# Patient Record
Sex: MALE | Race: WHITE | NOT HISPANIC OR LATINO | Employment: OTHER | URBAN - METROPOLITAN AREA
[De-identification: names, ages, dates, MRNs, and addresses within clinical notes are randomized per-mention and may not be internally consistent; named-entity substitution may affect disease eponyms.]

---

## 2023-10-10 ENCOUNTER — HOSPITAL ENCOUNTER (EMERGENCY)
Facility: HOSPITAL | Age: 73
Discharge: HOME | End: 2023-10-11
Attending: STUDENT IN AN ORGANIZED HEALTH CARE EDUCATION/TRAINING PROGRAM | Admitting: STUDENT IN AN ORGANIZED HEALTH CARE EDUCATION/TRAINING PROGRAM
Payer: COMMERCIAL

## 2023-10-10 DIAGNOSIS — R07.9 CHEST PAIN, UNSPECIFIED TYPE: ICD-10-CM

## 2023-10-10 DIAGNOSIS — J18.9 PNEUMONIA OF RIGHT UPPER LOBE DUE TO INFECTIOUS ORGANISM: ICD-10-CM

## 2023-10-10 DIAGNOSIS — K22.0 ACHALASIA OF ESOPHAGUS: ICD-10-CM

## 2023-10-10 PROCEDURE — 99285 EMERGENCY DEPT VISIT HI MDM: CPT | Mod: 25

## 2023-10-10 PROCEDURE — 93005 ELECTROCARDIOGRAM TRACING: CPT | Performed by: STUDENT IN AN ORGANIZED HEALTH CARE EDUCATION/TRAINING PROGRAM

## 2023-10-10 PROCEDURE — 96375 TX/PRO/DX INJ NEW DRUG ADDON: CPT

## 2023-10-10 PROCEDURE — 96365 THER/PROPH/DIAG IV INF INIT: CPT | Mod: 59

## 2023-10-11 ENCOUNTER — APPOINTMENT (OUTPATIENT)
Dept: CT IMAGING | Facility: HOSPITAL | Age: 73
End: 2023-10-11
Attending: STUDENT IN AN ORGANIZED HEALTH CARE EDUCATION/TRAINING PROGRAM
Payer: COMMERCIAL

## 2023-10-11 VITALS
DIASTOLIC BLOOD PRESSURE: 54 MMHG | BODY MASS INDEX: 20.46 KG/M2 | HEART RATE: 77 BPM | HEIGHT: 68 IN | WEIGHT: 135 LBS | SYSTOLIC BLOOD PRESSURE: 99 MMHG | TEMPERATURE: 98.6 F | OXYGEN SATURATION: 93 % | RESPIRATION RATE: 22 BRPM

## 2023-10-11 LAB
ANION GAP SERPL CALCULATED.3IONS-SCNC: 12 MMOL/L (ref 7–15)
BASO+EOS+MONOS # BLD AUTO: ABNORMAL 10*3/UL
BASO+EOS+MONOS NFR BLD AUTO: ABNORMAL %
BASOPHILS # BLD AUTO: 0.1 10E3/UL (ref 0–0.2)
BASOPHILS NFR BLD AUTO: 1 %
BUN SERPL-MCNC: 12.2 MG/DL (ref 8–23)
CALCIUM SERPL-MCNC: 10.1 MG/DL (ref 8.8–10.2)
CHLORIDE SERPL-SCNC: 98 MMOL/L (ref 98–107)
CREAT SERPL-MCNC: 0.87 MG/DL (ref 0.67–1.17)
DEPRECATED HCO3 PLAS-SCNC: 28 MMOL/L (ref 22–29)
EGFRCR SERPLBLD CKD-EPI 2021: >90 ML/MIN/1.73M2
EOSINOPHIL # BLD AUTO: 0.2 10E3/UL (ref 0–0.7)
EOSINOPHIL NFR BLD AUTO: 2 %
ERYTHROCYTE [DISTWIDTH] IN BLOOD BY AUTOMATED COUNT: 14 % (ref 10–15)
GLUCOSE SERPL-MCNC: 115 MG/DL (ref 70–99)
HCT VFR BLD AUTO: 45.3 % (ref 40–53)
HGB BLD-MCNC: 15.1 G/DL (ref 13.3–17.7)
HOLD SPECIMEN: NORMAL
IMM GRANULOCYTES # BLD: 0.1 10E3/UL
IMM GRANULOCYTES NFR BLD: 0 %
LYMPHOCYTES # BLD AUTO: 1.5 10E3/UL (ref 0.8–5.3)
LYMPHOCYTES NFR BLD AUTO: 11 %
MCH RBC QN AUTO: 32.4 PG (ref 26.5–33)
MCHC RBC AUTO-ENTMCNC: 33.3 G/DL (ref 31.5–36.5)
MCV RBC AUTO: 97 FL (ref 78–100)
MONOCYTES # BLD AUTO: 1.4 10E3/UL (ref 0–1.3)
MONOCYTES NFR BLD AUTO: 10 %
NEUTROPHILS # BLD AUTO: 10.8 10E3/UL (ref 1.6–8.3)
NEUTROPHILS NFR BLD AUTO: 76 %
NRBC # BLD AUTO: 0 10E3/UL
NRBC BLD AUTO-RTO: 0 /100
PLATELET # BLD AUTO: 408 10E3/UL (ref 150–450)
POTASSIUM SERPL-SCNC: 3.8 MMOL/L (ref 3.4–5.3)
RBC # BLD AUTO: 4.66 10E6/UL (ref 4.4–5.9)
SODIUM SERPL-SCNC: 138 MMOL/L (ref 135–145)
TROPONIN T SERPL HS-MCNC: 6 NG/L
TROPONIN T SERPL HS-MCNC: 6 NG/L
WBC # BLD AUTO: 13.9 10E3/UL (ref 4–11)

## 2023-10-11 PROCEDURE — 84484 ASSAY OF TROPONIN QUANT: CPT | Performed by: STUDENT IN AN ORGANIZED HEALTH CARE EDUCATION/TRAINING PROGRAM

## 2023-10-11 PROCEDURE — 250N000011 HC RX IP 250 OP 636: Mod: JZ | Performed by: STUDENT IN AN ORGANIZED HEALTH CARE EDUCATION/TRAINING PROGRAM

## 2023-10-11 PROCEDURE — 250N000013 HC RX MED GY IP 250 OP 250 PS 637: Performed by: STUDENT IN AN ORGANIZED HEALTH CARE EDUCATION/TRAINING PROGRAM

## 2023-10-11 PROCEDURE — 250N000011 HC RX IP 250 OP 636: Performed by: STUDENT IN AN ORGANIZED HEALTH CARE EDUCATION/TRAINING PROGRAM

## 2023-10-11 PROCEDURE — 36415 COLL VENOUS BLD VENIPUNCTURE: CPT | Performed by: STUDENT IN AN ORGANIZED HEALTH CARE EDUCATION/TRAINING PROGRAM

## 2023-10-11 PROCEDURE — 80048 BASIC METABOLIC PNL TOTAL CA: CPT | Performed by: STUDENT IN AN ORGANIZED HEALTH CARE EDUCATION/TRAINING PROGRAM

## 2023-10-11 PROCEDURE — 74174 CTA ABD&PLVS W/CONTRAST: CPT

## 2023-10-11 PROCEDURE — 85048 AUTOMATED LEUKOCYTE COUNT: CPT | Performed by: STUDENT IN AN ORGANIZED HEALTH CARE EDUCATION/TRAINING PROGRAM

## 2023-10-11 PROCEDURE — 250N000013 HC RX MED GY IP 250 OP 250 PS 637

## 2023-10-11 RX ORDER — HYDROMORPHONE HYDROCHLORIDE 1 MG/ML
0.5 INJECTION, SOLUTION INTRAMUSCULAR; INTRAVENOUS; SUBCUTANEOUS ONCE
Status: COMPLETED | OUTPATIENT
Start: 2023-10-11 | End: 2023-10-11

## 2023-10-11 RX ORDER — PIPERACILLIN SODIUM, TAZOBACTAM SODIUM 3; .375 G/15ML; G/15ML
3.38 INJECTION, POWDER, LYOPHILIZED, FOR SOLUTION INTRAVENOUS ONCE
Qty: 15 ML | Refills: 0 | Status: COMPLETED | OUTPATIENT
Start: 2023-10-11 | End: 2023-10-11

## 2023-10-11 RX ORDER — MORPHINE SULFATE 4 MG/ML
4 INJECTION, SOLUTION INTRAMUSCULAR; INTRAVENOUS
Status: COMPLETED | OUTPATIENT
Start: 2023-10-11 | End: 2023-10-11

## 2023-10-11 RX ORDER — ASPIRIN 81 MG/1
324 TABLET, CHEWABLE ORAL ONCE
Status: COMPLETED | OUTPATIENT
Start: 2023-10-11 | End: 2023-10-11

## 2023-10-11 RX ORDER — IOPAMIDOL 755 MG/ML
75 INJECTION, SOLUTION INTRAVASCULAR ONCE
Status: COMPLETED | OUTPATIENT
Start: 2023-10-11 | End: 2023-10-11

## 2023-10-11 RX ORDER — NITROGLYCERIN 0.4 MG/1
TABLET SUBLINGUAL
Status: COMPLETED
Start: 2023-10-11 | End: 2023-10-11

## 2023-10-11 RX ADMIN — NITROGLYCERIN: 0.4 TABLET, ORALLY DISINTEGRATING SUBLINGUAL at 00:18

## 2023-10-11 RX ADMIN — ANTACID/ANTIFLATULENT 4 G: 380; 550; 10; 10 GRANULE, EFFERVESCENT ORAL at 04:01

## 2023-10-11 RX ADMIN — IOPAMIDOL 75 ML: 755 INJECTION, SOLUTION INTRAVENOUS at 01:15

## 2023-10-11 RX ADMIN — HYDROMORPHONE HYDROCHLORIDE 0.5 MG: 1 INJECTION, SOLUTION INTRAMUSCULAR; INTRAVENOUS; SUBCUTANEOUS at 01:28

## 2023-10-11 RX ADMIN — ASPIRIN 81 MG CHEWABLE TABLET 324 MG: 81 TABLET CHEWABLE at 00:36

## 2023-10-11 RX ADMIN — Medication 0.5 MG: at 01:29

## 2023-10-11 RX ADMIN — MORPHINE SULFATE 4 MG: 4 INJECTION, SOLUTION INTRAMUSCULAR; INTRAVENOUS at 00:36

## 2023-10-11 RX ADMIN — PIPERACILLIN AND TAZOBACTAM 3.38 G: 3; .375 INJECTION, POWDER, LYOPHILIZED, FOR SOLUTION INTRAVENOUS at 04:02

## 2023-10-11 ASSESSMENT — ACTIVITIES OF DAILY LIVING (ADL)
ADLS_ACUITY_SCORE: 35

## 2023-10-11 NOTE — ED TRIAGE NOTES
Pt reports sudden severe CP that started more near his shoulder and moved to the center of his chest.  Pt denies any previous cardiac history.  Pt was at rest when the pain started.  Pt also reports SOB d/t the pain in his chest.     Triage Assessment       Row Name 10/10/23 3424       Triage Assessment (Adult)    Airway WDL WDL       Respiratory WDL    Respiratory WDL X;rhythm/pattern    Rhythm/Pattern, Respiratory shortness of breath       Skin Circulation/Temperature WDL    Skin Circulation/Temperature WDL WDL       Cardiac WDL    Cardiac WDL X;chest pain       Chest Pain Assessment    Chest Pain Location midsternal    Chest Pain Radiation shoulder       Peripheral/Neurovascular WDL    Peripheral Neurovascular WDL WDL       Cognitive/Neuro/Behavioral WDL    Cognitive/Neuro/Behavioral WDL WDL

## 2023-10-11 NOTE — DISCHARGE INSTRUCTIONS
Your CT scan showed a right upper lobe pneumonia.  You were given a dose of antibiotic called Zosyn in the ED to get things started.  We also writing you a 1 week prescription for Augmentin.  Please take this until it is gone.     Contact your GI doctor back home to set up your next dilation as soon as possible.

## 2023-10-11 NOTE — ED PROVIDER NOTES
"EMERGENCY DEPARTMENT ENCOUNTER       ED Course & Medical Decision Making     12:18 AM I met with the patient to obtain patient history and performed a physical exam. Discussed plan for ED work up including potential diagnostic studies and interventions.  1:24 AM Nursing staff updated me. Patient is still complaining of chest pain even after Morphine. I reevaluated patient.  2:55 AM Reevaluated and updated patient with findings of CT scan.  3:21 AM Reevaluated and updated the patient with findings.  4:20 AM Reevaluated patient. Patient reports his symptoms have improved. We discussed the plan for discharge and the patient is agreeable. Reviewed supportive cares, symptomatic treatment, outpatient follow up, and reasons to return to the Emergency Department. Patient to be discharged by ED RN.     Final Impression  73 year old male brought in by wife for evaluation of sudden onset chest pain about 9 PM.  Began feeling some pain in his left shoulder that radiated to his mid upper chest, has never had pain like this before.  On initial evaluation patient does appear fairly uncomfortable complaining of upper chest pain.  On discussion with patient sounds like he does have a history of achalasia for which she gets dilations of his esophagus every 3 months or so, last was about 2 months ago, has been doing this for several years through [facility].  States he is frequently has food that hangs up in his esophagus and just \"drips through\" his lower esophageal sphincter, often relying on soft foods such as cream of wheat, mashed potatoes, Ensure, etc. to make sure that things passed through his lower esophageal sphincter reliably.  Given his significant chest discomfort that is different than priors with a normal EKG, did consider other etiologies thus CTA chest abdomen pelvis was performed to rule out dissection or aneurysm.  CTA chest and pelvis returned showing a right upper lobe pneumonia, " suspect from mild aspiration as patient CT also shows a significantly dilated and food-filled esophagus that I suspect is contributing to his upper chest discomfort.  Patient treated with tab of nitro when he first arrived, no real improvement in symptoms.  Did trial 0.5 mg IV glucagon which actually improved patient's symptoms quite a bit.  Does report eating a sugar cookie this evening which may have precipitated his symptoms as it is a slightly more solid food that he typically eats, they are appear visiting for his grandson's induction into the [employer].  Labs fairly reassuring, troponin negative x2, mild leukocytosis of 13.9, could be combination of demargination versus right upper lobe pneumonia seen on CT scan.  First dose of Zosyn given in the ED, will write prescription for Augmentin to complete as an outpatient.  Also recommend he contact his GI doctor for close follow-up and dilation once he returns back home.  All questions answered, will discharge home with wife.  Patient able to tolerate oral fluids by time of final reevaluation.    Prior to making a final disposition on this patient the results of patient's tests and other diagnostic studies were discussed with the patient. All questions were answered. Patient expressed understanding of the plan and was amenable to it.    Medical Decision Making    History:  Supplemental history from: Wife  External Record(s) reviewed as documented below;  9/25/2023 [facility] summarization note    Work Up:  Chart documentation includes differential considered and any EKGs or imaging independently interpreted by provider, where specified.  DDx considered but not limited to: Aortic dissection, aortic aneurysm, ACS, esophageal food bolus, achalasia flare  Administered IV antibiotics for pneumonia    Complicating factors:  Care impacted by chronic illness: Other: Achalasia, hearing loss,  Care affected by social determinants of health:  N/A    Disposition considerations: Discharge. I prescribed additional prescription strength medication(s) as charted. I considered admission, but discharged patient after significant clinical improvement.      Medications   piperacillin-tazobactam (ZOSYN) 3.375 g vial to attach to  mL bag (3.375 g Intravenous $New Bag 10/11/23 0402)   nitroGLYcerin (NITROSTAT) 0.4 MG sublingual tablet (  $Given 10/11/23 0018)   aspirin (ASA) chewable tablet 324 mg (324 mg Oral $Given 10/11/23 0036)   morphine (PF) injection 4 mg (4 mg Intravenous $Given 10/11/23 0036)   iopamidol (ISOVUE-370) solution 75 mL (75 mLs Intravenous $Given 10/11/23 0115)   HYDROmorphone (PF) (DILAUDID) injection 0.5 mg (0.5 mg Intravenous $Given 10/11/23 0128)   glucagon injection 0.5 mg (0.5 mg Intravenous $Given 10/11/23 0129)   sod bicarbonate-citric acid-simethicone (EZ GAS) 2.21-1.53-0.04 g packet 4 g (4 g Oral $Given 10/11/23 0401)       New Prescriptions    No medications on file       Final Impression     1. Pneumonia of right upper lobe due to infectious organism    2. Achalasia of esophagus    3. Chest pain, unspecified type        Chief Complaint     Chief Complaint   Patient presents with    Chest Pain    Breathing Problem     Pt reports sudden severe CP that started more near his shoulder and moved to the center of his chest.  Pt denies any previous cardiac history.  Pt was at rest when the pain started.  Pt also reports SOB d/t the pain in his chest.    HPI     Saravanan Silva is a 73 year old male who presents for evaluation of chest pain.    Patient reports at 9 PM, he was in bed watching TV when he felt sudden onset left shoulder pain that radiated into his midsternal chest. He describes the chest pain as 8-9/10. He's never felt this before. Since then, the pain has been persistent. Wife notes that patient has a history of Achalasia and goes to his clinic to get a dilation every 3 months. Patient says that his current pain doesn't  "feel like his Achalasia. Due to the persistent symptoms, they decided to come into the ED for evaluation. The pain is exacerbated with palpation. He associates shortness of breath secondary to the pain. He otherwise denies associating abdominal pain. He denies history of cardiac history. He's had cardiac work ups in the past (about 30 years ago) during routine physicals. There were no other concerns/complaints at this time.    I, Irena Fernie am serving as a scribe to document services personally performed by Dr. Aly Guillaume MD, based on my observation and the provider's statements to me. I, Dr. Aly Guillaume MD attest that Irena Fernie is acting in a scribe capacity, has observed my performance of the services and has documented them in accordance with my direction.    Physical Exam     /54   Pulse 75   Temp 98.6  F (37  C) (Temporal)   Resp 18   Ht 1.727 m (5' 8\")   Wt 61.2 kg (135 lb)   SpO2 93%   BMI 20.53 kg/m    Constitutional: Awake, alert, appears fairly uncomfortable due to upper chest pain  Head: Normocephalic, atraumatic.  ENT: Mucous membranes moist.  Eyes: Conjunctiva normal.  Respiratory: Respirations even, unlabored, in no acute respiratory distress.  Lungs clear to auscultation bilaterally  Cardiovascular: Regular rate and rhythm. Good peripheral perfusion.  No rubs or murmurs.  GI: Abdomen soft, non-tender.  No palpable masses.  No guarding or rebound.  Musculoskeletal: Moves all 4 extremities equally.  Integument: Warm, dry.  Neurologic: Alert & oriented x 3. Normal speech. Grossly normal motor and sensory function. No focal deficits noted.  Psychiatric: Normal mood    Labs & Imaging     Imaging reviewed and independently interpreted as below;   CTA chest abdomen pelvis images reviewed, aorta appears grossly normal, no dissection or obvious large aneurysms.  Significant food matter and dilation of the esophagus up into the mid chest.  Haziness bordering on consolidation in the right " upper lobe.      Results for orders placed or performed during the hospital encounter of 10/10/23   CTA Chest Abdomen Pelvis w Contrast    Impression    IMPRESSION:   1. No evidence of thoracoabdominal aortic aneurysm or dissection.  2. Right upper lobe nodular and patchy groundglass opacities, likely infectious.  3. Dilated food and fluid-filled esophagus, likely related to patient's history of achalasia.  4. Colonic diverticulosis without CT evidence of acute diverticulitis.     Basic metabolic panel   Result Value Ref Range    Sodium 138 135 - 145 mmol/L    Potassium 3.8 3.4 - 5.3 mmol/L    Chloride 98 98 - 107 mmol/L    Carbon Dioxide (CO2) 28 22 - 29 mmol/L    Anion Gap 12 7 - 15 mmol/L    Urea Nitrogen 12.2 8.0 - 23.0 mg/dL    Creatinine 0.87 0.67 - 1.17 mg/dL    GFR Estimate >90 >60 mL/min/1.73m2    Calcium 10.1 8.8 - 10.2 mg/dL    Glucose 115 (H) 70 - 99 mg/dL   Result Value Ref Range    Troponin T, High Sensitivity 6 <=22 ng/L   CBC with platelets and differential   Result Value Ref Range    WBC Count 13.9 (H) 4.0 - 11.0 10e3/uL    RBC Count 4.66 4.40 - 5.90 10e6/uL    Hemoglobin 15.1 13.3 - 17.7 g/dL    Hematocrit 45.3 40.0 - 53.0 %    MCV 97 78 - 100 fL    MCH 32.4 26.5 - 33.0 pg    MCHC 33.3 31.5 - 36.5 g/dL    RDW 14.0 10.0 - 15.0 %    Platelet Count 408 150 - 450 10e3/uL    % Neutrophils 76 %    % Lymphocytes 11 %    % Monocytes 10 %    Mids % (Monos, Eos, Basos)      % Eosinophils 2 %    % Basophils 1 %    % Immature Granulocytes 0 %    NRBCs per 100 WBC 0 <1 /100    Absolute Neutrophils 10.8 (H) 1.6 - 8.3 10e3/uL    Absolute Lymphocytes 1.5 0.8 - 5.3 10e3/uL    Absolute Monocytes 1.4 (H) 0.0 - 1.3 10e3/uL    Mids Abs (Monos, Eos, Basos)      Absolute Eosinophils 0.2 0.0 - 0.7 10e3/uL    Absolute Basophils 0.1 0.0 - 0.2 10e3/uL    Absolute Immature Granulocytes 0.1 <=0.4 10e3/uL    Absolute NRBCs 0.0 10e3/uL   Extra Red Top Tube   Result Value Ref Range    Hold Specimen JIC    Extra Green Top (Lithium  Heparin) Tube   Result Value Ref Range    Hold Specimen JIC    Extra Purple Top Tube   Result Value Ref Range    Hold Specimen JIC    Result Value Ref Range    Troponin T, High Sensitivity 6 <=22 ng/L       EKG     EKG reviewed and independently interpreted as below;  Sinus rhythm, rate 80.  .  .  QTc 422.  No STEMI.  Incomplete right bundle branch block, left anterior fascicular block.     Aly Guillaume MD  10/11/23 0443

## 2023-10-13 LAB
ATRIAL RATE - MUSE: 80 BPM
DIASTOLIC BLOOD PRESSURE - MUSE: NORMAL MMHG
INTERPRETATION ECG - MUSE: NORMAL
P AXIS - MUSE: 66 DEGREES
PR INTERVAL - MUSE: 128 MS
QRS DURATION - MUSE: 116 MS
QT - MUSE: 366 MS
QTC - MUSE: 422 MS
R AXIS - MUSE: -48 DEGREES
SYSTOLIC BLOOD PRESSURE - MUSE: NORMAL MMHG
T AXIS - MUSE: 50 DEGREES
VENTRICULAR RATE- MUSE: 80 BPM